# Patient Record
Sex: FEMALE | Race: WHITE | NOT HISPANIC OR LATINO
[De-identification: names, ages, dates, MRNs, and addresses within clinical notes are randomized per-mention and may not be internally consistent; named-entity substitution may affect disease eponyms.]

---

## 2021-10-12 PROBLEM — Z00.00 ENCOUNTER FOR PREVENTIVE HEALTH EXAMINATION: Status: ACTIVE | Noted: 2021-10-12

## 2021-10-13 ENCOUNTER — NON-APPOINTMENT (OUTPATIENT)
Age: 66
End: 2021-10-13

## 2021-10-15 ENCOUNTER — APPOINTMENT (OUTPATIENT)
Dept: OTOLARYNGOLOGY | Facility: CLINIC | Age: 66
End: 2021-10-15
Payer: MEDICARE

## 2021-10-15 PROCEDURE — 99213 OFFICE O/P EST LOW 20 MIN: CPT

## 2021-10-15 NOTE — ASSESSMENT
[FreeTextEntry1] : VIC BECKER\par  has left OLIVIA and LPR (following URI).\par I suggest ZPAK\par I suggested and discussed in detail a strict reflux diet and gave the patient a handout.\par I am recommending Pepcid 20mg  before bedtime.RTC 4 weeks\par \par

## 2021-10-15 NOTE — HISTORY OF PRESENT ILLNESS
[de-identified] : VIC BECKER is a 66 year woman with a history of URI in September.Her lest ear is stuffy and she has tinnitus. Dr. Shepherd found fluid AS. She is still congested with PND.

## 2021-10-15 NOTE — CONSULT LETTER
[Dear  ___] : Dear  [unfilled], [Consult Letter:] : I had the pleasure of evaluating your patient, [unfilled]. [Please see my note below.] : Please see my note below. [Sincerely,] : Sincerely, [FreeTextEntry3] : Edilberto King MD\par

## 2021-11-09 ENCOUNTER — APPOINTMENT (OUTPATIENT)
Dept: OTOLARYNGOLOGY | Facility: CLINIC | Age: 66
End: 2021-11-09
Payer: MEDICARE

## 2021-11-09 DIAGNOSIS — H65.05 ACUTE SEROUS OTITIS MEDIA, RECURRENT, LEFT EAR: ICD-10-CM

## 2021-11-09 DIAGNOSIS — J31.0 CHRONIC RHINITIS: ICD-10-CM

## 2021-11-09 DIAGNOSIS — H93.12 TINNITUS, LEFT EAR: ICD-10-CM

## 2021-11-09 PROCEDURE — 31231 NASAL ENDOSCOPY DX: CPT

## 2021-11-09 PROCEDURE — 99214 OFFICE O/P EST MOD 30 MIN: CPT | Mod: 25

## 2021-11-09 PROCEDURE — 92567 TYMPANOMETRY: CPT

## 2021-11-09 PROCEDURE — 92557 COMPREHENSIVE HEARING TEST: CPT

## 2021-11-09 RX ORDER — FAMOTIDINE 40 MG/1
TABLET, FILM COATED ORAL
Refills: 0 | Status: ACTIVE | COMMUNITY

## 2021-11-09 RX ORDER — VALSARTAN AND HYDROCHLOROTHIAZIDE 320; 25 MG/1; MG/1
TABLET, FILM COATED ORAL
Refills: 0 | Status: ACTIVE | COMMUNITY

## 2021-11-09 NOTE — ASSESSMENT
[FreeTextEntry1] : VIC BECKER has nasal and sinus congestion. Audio shows NEGATIVE pressure AU I cultured her nose. I will prescribe medrol pack. I discussed the risks of taking steroid medication including the risk of, osteoporosis and bone, fracture, GI problems, cataracts and mood changes. The patient indicated to me that he understands the risks. I will call with culture resuls.\par \par

## 2021-11-09 NOTE — HISTORY OF PRESENT ILLNESS
Addended by: STEVE SYKES on: 10/26/2020 08:26 AM     Modules accepted: Orders     [de-identified] : VIC BECKER is a 66 year woman with a history of left OLIVIA and LPR following URI Her hearing is improved but her ears and her nose/head are still congested. Sense of taste smell are a little down. She is on reflux diet, prevacid 15 mg and pepcid 20 mg.

## 2021-11-16 LAB — EAR NOSE AND THROAT CULTURE: NORMAL

## 2023-02-07 ENCOUNTER — APPOINTMENT (OUTPATIENT)
Dept: OTOLARYNGOLOGY | Facility: CLINIC | Age: 68
End: 2023-02-07
Payer: MEDICARE

## 2023-02-07 VITALS — BODY MASS INDEX: 27.23 KG/M2 | WEIGHT: 148 LBS | HEIGHT: 62 IN

## 2023-02-07 DIAGNOSIS — K21.9 GASTRO-ESOPHAGEAL REFLUX DISEASE W/OUT ESOPHAGITIS: ICD-10-CM

## 2023-02-07 DIAGNOSIS — K14.8 OTHER DISEASES OF TONGUE: ICD-10-CM

## 2023-02-07 PROCEDURE — 31575 DIAGNOSTIC LARYNGOSCOPY: CPT

## 2023-02-07 PROCEDURE — 99213 OFFICE O/P EST LOW 20 MIN: CPT | Mod: 25

## 2023-02-07 RX ORDER — METHYLPREDNISOLONE 4 MG/1
4 TABLET ORAL
Qty: 1 | Refills: 0 | Status: DISCONTINUED | COMMUNITY
Start: 2021-11-09 | End: 2023-02-07

## 2023-02-07 RX ORDER — BUPROPION HYDROCHLORIDE 75 MG/1
TABLET, FILM COATED ORAL
Refills: 0 | Status: DISCONTINUED | COMMUNITY
End: 2023-02-07

## 2023-02-07 RX ORDER — AZITHROMYCIN 250 MG/1
250 TABLET, FILM COATED ORAL
Qty: 6 | Refills: 1 | Status: DISCONTINUED | COMMUNITY
Start: 2021-10-15 | End: 2023-02-07

## 2023-02-07 RX ORDER — MERCAPTOPURINE 50 MG/1
TABLET ORAL
Refills: 0 | Status: DISCONTINUED | COMMUNITY
End: 2023-02-07

## 2023-02-07 NOTE — HISTORY OF PRESENT ILLNESS
[de-identified] : VIC BECKER is a 68 year woman with a history of recent biopsy of under surface that showed dysplasia.

## 2023-02-07 NOTE — REVIEW OF SYSTEMS
[Negative] : Heme/Lymph [Patient Intake Form Reviewed] : Patient intake form was reviewed [de-identified] : white spots on the tongue

## 2023-02-07 NOTE — ASSESSMENT
[FreeTextEntry1] : VIC BECKER has well healed incision site. I suggest she see Dr. Adarsh Degroot for her opinion.

## 2023-03-21 ENCOUNTER — TRANSCRIPTION ENCOUNTER (OUTPATIENT)
Age: 68
End: 2023-03-21

## 2023-03-21 NOTE — ASU PATIENT PROFILE, ADULT - FALL HARM RISK - UNIVERSAL INTERVENTIONS
Bed in lowest position, wheels locked, appropriate side rails in place/Call bell, personal items and telephone in reach/Instruct patient to call for assistance before getting out of bed or chair/Non-slip footwear when patient is out of bed/[place] to call system/Physically safe environment - no spills, clutter or unnecessary equipment/Purposeful Proactive Rounding/Room/bathroom lighting operational, light cord in reach

## 2023-03-21 NOTE — ASU PATIENT PROFILE, ADULT - NSICDXPASTMEDICALHX_GEN_ALL_CORE_FT
PAST MEDICAL HISTORY:  Anxiety     GERD (gastroesophageal reflux disease)     History of colitis     HTN (hypertension)     Other disturbances of oral epithelium, including tongue      PAST MEDICAL HISTORY:  Anxiety     GERD (gastroesophageal reflux disease)     History of colitis     HTN (hypertension)     Migraines     Other disturbances of oral epithelium, including tongue

## 2023-03-21 NOTE — ASU PATIENT PROFILE, ADULT - NSICDXPASTSURGICALHX_GEN_ALL_CORE_FT
PAST SURGICAL HISTORY:  Breast cyst REMOVED    H/O  section     History of cataract surgery      PAST SURGICAL HISTORY:  Breast cyst REMOVED RIGHT    H/O  section X1    History of cataract surgery BILAT

## 2023-03-22 ENCOUNTER — TRANSCRIPTION ENCOUNTER (OUTPATIENT)
Age: 68
End: 2023-03-22

## 2023-03-22 ENCOUNTER — OUTPATIENT (OUTPATIENT)
Dept: OUTPATIENT SERVICES | Facility: HOSPITAL | Age: 68
LOS: 1 days | Discharge: HOME | End: 2023-03-22
Payer: MEDICARE

## 2023-03-22 VITALS
HEART RATE: 60 BPM | TEMPERATURE: 98 F | DIASTOLIC BLOOD PRESSURE: 62 MMHG | SYSTOLIC BLOOD PRESSURE: 127 MMHG | RESPIRATION RATE: 15 BRPM | OXYGEN SATURATION: 98 %

## 2023-03-22 VITALS
DIASTOLIC BLOOD PRESSURE: 75 MMHG | RESPIRATION RATE: 16 BRPM | SYSTOLIC BLOOD PRESSURE: 120 MMHG | HEART RATE: 65 BPM | WEIGHT: 150.13 LBS | HEIGHT: 62 IN | OXYGEN SATURATION: 96 % | TEMPERATURE: 98 F

## 2023-03-22 DIAGNOSIS — Z98.49 CATARACT EXTRACTION STATUS, UNSPECIFIED EYE: Chronic | ICD-10-CM

## 2023-03-22 DIAGNOSIS — Z98.891 HISTORY OF UTERINE SCAR FROM PREVIOUS SURGERY: Chronic | ICD-10-CM

## 2023-03-22 DIAGNOSIS — N60.09 SOLITARY CYST OF UNSPECIFIED BREAST: Chronic | ICD-10-CM

## 2023-03-22 LAB — GLUCOSE BLDC GLUCOMTR-MCNC: 112 MG/DL — HIGH (ref 70–99)

## 2023-03-22 PROCEDURE — C1889: CPT

## 2023-03-22 PROCEDURE — 88307 TISSUE EXAM BY PATHOLOGIST: CPT

## 2023-03-22 PROCEDURE — 88331 PATH CONSLTJ SURG 1 BLK 1SPC: CPT

## 2023-03-22 PROCEDURE — 82962 GLUCOSE BLOOD TEST: CPT

## 2023-03-22 PROCEDURE — 88305 TISSUE EXAM BY PATHOLOGIST: CPT | Mod: 26

## 2023-03-22 PROCEDURE — 41130 PARTIAL REMOVAL OF TONGUE: CPT

## 2023-03-22 PROCEDURE — 88307 TISSUE EXAM BY PATHOLOGIST: CPT | Mod: 26

## 2023-03-22 PROCEDURE — 88331 PATH CONSLTJ SURG 1 BLK 1SPC: CPT | Mod: 26

## 2023-03-22 PROCEDURE — 88305 TISSUE EXAM BY PATHOLOGIST: CPT

## 2023-03-22 DEVICE — SURGIFLO HEMOSTATIC MATRIX KIT: Type: IMPLANTABLE DEVICE | Site: BILATERAL | Status: FUNCTIONAL

## 2023-03-22 RX ORDER — OXYCODONE HYDROCHLORIDE 5 MG/1
10 TABLET ORAL ONCE
Refills: 0 | Status: DISCONTINUED | OUTPATIENT
Start: 2023-03-22 | End: 2023-03-22

## 2023-03-22 RX ORDER — PANTOPRAZOLE SODIUM 20 MG/1
1 TABLET, DELAYED RELEASE ORAL
Qty: 0 | Refills: 0 | DISCHARGE

## 2023-03-22 RX ORDER — ACETAMINOPHEN 500 MG
2 TABLET ORAL
Qty: 30 | Refills: 0
Start: 2023-03-22 | End: 2023-03-26

## 2023-03-22 RX ORDER — OXYCODONE HYDROCHLORIDE 5 MG/1
1 TABLET ORAL
Qty: 12 | Refills: 0
Start: 2023-03-22 | End: 2023-03-24

## 2023-03-22 RX ORDER — ATORVASTATIN CALCIUM 80 MG/1
1 TABLET, FILM COATED ORAL
Qty: 0 | Refills: 0 | DISCHARGE

## 2023-03-22 RX ORDER — AMOXICILLIN 250 MG/5ML
1 SUSPENSION, RECONSTITUTED, ORAL (ML) ORAL
Qty: 21 | Refills: 0
Start: 2023-03-22 | End: 2023-03-28

## 2023-03-22 RX ORDER — MESALAMINE 400 MG
2 TABLET, DELAYED RELEASE (ENTERIC COATED) ORAL
Qty: 0 | Refills: 0 | DISCHARGE

## 2023-03-22 RX ORDER — CHLORHEXIDINE GLUCONATE 213 G/1000ML
15 SOLUTION TOPICAL
Qty: 473 | Refills: 0
Start: 2023-03-22 | End: 2023-03-28

## 2023-03-22 RX ADMIN — OXYCODONE HYDROCHLORIDE 10 MILLIGRAM(S): 5 TABLET ORAL at 17:21

## 2023-03-22 NOTE — PRE-ANESTHESIA EVALUATION ADULT - NSANTHOSAYNRD_GEN_A_CORE
No. SCOTT screening performed.  STOP BANG Legend: 0-2 = LOW Risk; 3-4 = INTERMEDIATE Risk; 5-8 = HIGH Risk
No. SCOTT screening performed.  STOP BANG Legend: 0-2 = LOW Risk; 3-4 = INTERMEDIATE Risk; 5-8 = HIGH Risk

## 2023-03-22 NOTE — H&P ADULT - HISTORY OF PRESENT ILLNESS
68 year old female with history of tongue lesion at left side, previously biopsied showing severe dysplasia and right tongue with leukoplakic changes, planned for excisional biopsy of left tongue and incisional biopsy w/ frozen sections of right tongue under GA.

## 2023-03-22 NOTE — H&P ADULT - ASSESSMENT
68 year old female with history of tongue lesion at left side, previously biopsied showing severe dysplasia and right tongue with leukoplakic changes, planned for excisional biopsy of left tongue and incisional biopsy w/ frozen sections of right tongue under GA.    - Anticipate d/c home after surgery

## 2023-03-22 NOTE — PRE-ANESTHESIA EVALUATION ADULT - NSATTENDATTESTRD_GEN_ALL_CORE
PEC received in CPT. Will begin actively seeking inpatient psych placement  
The patient has been re-examined and I agree with the above assessment or I updated with my findings.
The patient has been re-examined and I agree with the above assessment or I updated with my findings.

## 2023-03-22 NOTE — ASU DISCHARGE PLAN (ADULT/PEDIATRIC) - CARE PROVIDER_API CALL
Adarsh Degroot (MD; DDS)  OralMaxillofacial Surgery  [address] [place] [address], [facility] [address] [place] [address]  Phone: [phone]  Fax: [phone]  Follow Up Time: 1 week

## 2023-03-22 NOTE — H&P ADULT - NSICDXPASTSURGICALHX_GEN_ALL_CORE_FT
PAST SURGICAL HISTORY:  Breast cyst REMOVED RIGHT    H/O  section X1    History of cataract surgery BILAT

## 2023-03-22 NOTE — H&P ADULT - NSICDXPASTMEDICALHX_GEN_ALL_CORE_FT
PAST MEDICAL HISTORY:  Anxiety     GERD (gastroesophageal reflux disease)     History of colitis     HTN (hypertension)     Migraines     Other disturbances of oral epithelium, including tongue

## 2023-03-30 LAB — SURGICAL PATHOLOGY STUDY: SIGNIFICANT CHANGE UP

## 2024-06-21 NOTE — ASU PREOP CHECKLIST - TAMPON REMOVED
Pressure grade: 15-20 mmHg compression stockings, knee high    Colin Mann MD - Vascular Medicine / Vein Specialist at [facility] - Cosmetic procedures  Dr. Kayleigh Prado / Dr. Don Morales  [address], [place] [address]  [phone]      ACTINIC KERATOSES     Actinic keratoses are areas of sun-damaged skin. There is a small risk that an actinic keratosis could progress into a form of skin cancer called squamous cell carcinoma and are considered to be pre-skin cancers.     Actinic keratosis can be treated in a number of different ways:  Cryotherapy, topical creams, or photodynamic therapy.    Protect the skin from further sun damage will prevent you from getting more actinic keratosis and reduce your risk of getting skin cancer.       You should be extra cautious in the sun by following these recommendations:        Protect yourself from the sun between 10 am to 2 pm when the sun is at its strongest.      Wear protective clothing - hats, long sleeves, long pants.      Apply a sunscreen regularly to exposed skin before going into the sun, using a sun protection factor of 30 or above and one which is able to block both UVA and UVB light.  Re-apply the sun screen.       Protecting your children from the sun in the same way may reduce their risk of developing actinic keratoses.      Avoid artificial sunlamps, including tanning beds.      Be skin aware - examine your own skin every few months and contact the office f you notice something new or changing.      SEBORRHEIC KERATOSES    Seborrheic keratoses are very common harmless, often pigmented, growths on the skin.  They are due to a build up of ordinary skin cells.  They can itch, become inflamed, and catch on clothing.  They are not infectious; they can never become a cancer.      Treatment can occur by either freezing them with liquid nitrogen (cryotherapy), or scraping them off (curettage).  This is not a procedure that is  typically covered by insurance but can be done cosmetically if desired.       POST-CRYOTHERAPY INSTRUCTIONS  (AKA LIQUID NITROGEN TREATMENT)    Your skin has been treated with liquid nitrogen, also known as cryotherapy.   This creates a superficial burn, like frostbite to the skin, which should destroy the underlying lesion.   You can expect the area to be pink and inflamed for about 48 hours.   Around day 3-4 after treatment the treated site will darken, forms a scab or blister, and then slough away.   KEEP VASELINE PETROLEUM JELLY OR AQUAPHOR on the site at all times during this phase, which speeds the healing process.   Use gentle soap and water to cleanse the skin.  Do not pick at any scabs.   Treated skin usually heals within 1-3 weeks.  It may be pink for up to 3 months, and excess pigment is often removed, so the treated areas may be lighter in color than the surrounding skin.       Below is the contact information for the cosmetic clinic we discussed today. Please call them if interested in a consultation.            underwear on/n/a
